# Patient Record
Sex: FEMALE | Race: WHITE | Employment: FULL TIME | URBAN - METROPOLITAN AREA
[De-identification: names, ages, dates, MRNs, and addresses within clinical notes are randomized per-mention and may not be internally consistent; named-entity substitution may affect disease eponyms.]

---

## 2017-03-09 RX ORDER — LEVOTHYROXINE SODIUM 137 UG/1
TABLET ORAL
Qty: 37 TAB | Refills: 0 | Status: SHIPPED | OUTPATIENT
Start: 2017-03-09 | End: 2017-04-05 | Stop reason: SDUPTHER

## 2017-03-13 ENCOUNTER — TELEPHONE (OUTPATIENT)
Dept: ENDOCRINOLOGY | Age: 37
End: 2017-03-13

## 2017-03-13 NOTE — TELEPHONE ENCOUNTER
Patient is requesting a sooner appointment than first available. She stated that she had spoken with someone in January and scheduled her for 4/20/17 at 2:30pm. However, the appointment looks like it was not scheduled. The patient is requesting to still be seen on that date if possible in the afternoon. Please let me know if you can see her for the date requested. Thank you!     America Shrestha

## 2017-03-15 NOTE — TELEPHONE ENCOUNTER
I can't see her at that time since I already have a patient scheduled, however if you will call and offer her one of my sooner 930 slots I can see her in March.     Thanks,    Denton Thorne

## 2017-04-06 RX ORDER — LEVOTHYROXINE SODIUM 137 UG/1
TABLET ORAL
Qty: 37 TAB | Refills: 0 | Status: SHIPPED | OUTPATIENT
Start: 2017-04-06 | End: 2017-05-05 | Stop reason: SDUPTHER

## 2017-05-05 RX ORDER — LEVOTHYROXINE SODIUM 137 UG/1
TABLET ORAL
Qty: 37 TAB | Refills: 1 | Status: SHIPPED | OUTPATIENT
Start: 2017-05-05 | End: 2017-06-12 | Stop reason: SDUPTHER

## 2017-06-12 RX ORDER — LEVOTHYROXINE SODIUM 137 UG/1
TABLET ORAL
Qty: 37 TAB | Refills: 0 | Status: SHIPPED | OUTPATIENT
Start: 2017-06-12 | End: 2017-06-22 | Stop reason: SDUPTHER

## 2017-06-12 NOTE — TELEPHONE ENCOUNTER
Patient is requesting a refill on her levothyroxine. She stated that she has an upcoming appointment on the 22nd of this month and is not going to have enough to make it to that day. She stated that she would like to have the prescription sent to [facility] in [facility]. The phone number there is [phone].

## 2017-06-22 ENCOUNTER — TELEPHONE (OUTPATIENT)
Dept: ENDOCRINOLOGY | Age: 37
End: 2017-06-22

## 2017-06-22 ENCOUNTER — OFFICE VISIT (OUTPATIENT)
Dept: ENDOCRINOLOGY | Age: 37
End: 2017-06-22

## 2017-06-22 VITALS
HEIGHT: 67 IN | WEIGHT: 206.2 LBS | DIASTOLIC BLOOD PRESSURE: 55 MMHG | SYSTOLIC BLOOD PRESSURE: 104 MMHG | HEART RATE: 65 BPM | BODY MASS INDEX: 32.36 KG/M2

## 2017-06-22 DIAGNOSIS — E01.0 THYROMEGALY: ICD-10-CM

## 2017-06-22 DIAGNOSIS — E03.9 HYPOTHYROIDISM (ACQUIRED): Primary | ICD-10-CM

## 2017-06-22 RX ORDER — LEVOTHYROXINE SODIUM 137 UG/1
137 TABLET ORAL
Qty: 30 TAB | Refills: 6 | Status: SHIPPED | OUTPATIENT
Start: 2017-06-22 | End: 2017-08-18 | Stop reason: SDUPTHER

## 2017-06-22 RX ORDER — PHENDIMETRAZINE TARTRATE 35 MG/1
TABLET ORAL
Refills: 1 | COMMUNITY
Start: 2017-05-14 | End: 2017-12-20

## 2017-06-22 NOTE — MR AVS SNAPSHOT
Visit Information Date & Time Provider Department Dept. Phone Encounter #  
 6/22/2017  2:30 PM Mary Arellano, [address] [place] [address] Diabetes and Endocrinology [phone] Follow-up Instructions Return in about 6 months (around 12/22/2017). Upcoming Health Maintenance Date Due  
 PAP AKA CERVICAL CYTOLOGY 10/15/2016 INFLUENZA AGE 9 TO ADULT 8/1/2017 DTaP/Tdap/Td series (2 - Td) 10/10/2024 Allergies as of 6/22/2017  Review Complete On: 6/22/2017 By: Mary Arellano MD  
 No Known Allergies Current Immunizations  Never Reviewed Name Date Rho(D) Immune Globulin - IM 10/8/2014 10:33 AM  
 Tdap 10/10/2014 10:12 AM  
  
 Not reviewed this visit You Were Diagnosed With   
  
 Codes Comments Hypothyroidism (acquired)    -  Primary ICD-10-CM: E03.9 ICD-9-CM: 772. 9 Thyromegaly     ICD-10-CM: E01.0 ICD-9-CM: 240.9 Vitals BP Pulse Height(growth percentile) Weight(growth percentile) BMI OB Status 104/55 65 5' 7\" (1.702 m) 206 lb 3.2 oz (93.5 kg) 32.3 kg/m2 Having regular periods Smoking Status Never Smoker Vitals History BMI and BSA Data Body Mass Index Body Surface Area  
 32.3 kg/m 2 2.1 m 2 Preferred Pharmacy Pharmacy Name Phone [facility] [phone] [address] [phone] Your Updated Medication List  
  
   
This list is accurate as of: 6/22/17  3:07 PM.  Always use your most recent med list.  
  
  
  
  
 levothyroxine 137 mcg tablet Commonly known as:  SYNTHROID Take 137 mcg by mouth Daily (before breakfast). phendimetrazine tartrate 35 mg Tab TAKE 1 TABLET PO QD Prescriptions Sent to Pharmacy Refills  
 levothyroxine (SYNTHROID) 137 mcg tablet 6 Sig: Take 137 mcg by mouth Daily (before breakfast). Class: Normal  
 Pharmacy: [facility] [id number] #: [phone] Route: Oral  
  
Follow-up Instructions Return in about 6 months (around 12/22/2017). To-Do List   
 08/03/2017 Lab:  THYROID PANEL W/TSH Patient Instructions Decrease your Levothyroxine back to 137mcg one pill daily. Introducing [facility] & HEALTH SERVICES! Dear Jeyson Funes: 
Thank you for requesting a [facility] account. Our records indicate that you have previously registered for a [facility] account but its currently inactive. Please call our [facility] support line at [phone]. Additional Information If you have questions, please visit the Frequently Asked Questions section of the [facility] website at [email or web address] Remember, [facility] is NOT to be used for urgent needs. For medical emergencies, dial 911. Now available from your iPhone and Android! Please provide this summary of care documentation to your next provider. Your primary care clinician is listed as Keren Kline. If you have any questions after today's visit, please call [phone].

## 2017-06-22 NOTE — PROGRESS NOTES
Chief Complaint   Patient presents with    Thyroid Problem     pcp and pharmacy verified. Records since last visit reviewed  History of Present Illness: Melisa Cortes is a 39 y.o. female Melisa Cortes is a 27 y.o. female here for follow up of hypothyroidism and thyromegaly. Pt was originally diagnosed with hypothyroidism in 2013, she subsequently become pregnant and delivered in October of 2014. After delivery her OB checked her thyroid labs and she was found to have a TSH of 63 with FT4 of 0.56. She has been on LT4 ever since. At our last appointment in July 2016 her TSH was 8.41 with TT4 9.8 so I increased her LT4 from 137mcg 7 pills per week to 8.5 pills per week. I have not seen pt since July 2016 and she never had the repeat TFTs. In May 2017 her TSH was 0.006 with FT4 of 2.81. Pt is taking the LT4 137mcg 1-1/2 pills Mon/Wed/Fri and one pill Tues/Thurs/Sat/Sun. Has lost 60 pounds since July 2016. She has been working with Dr. Krissy Craig to lose weight. She is taking Phendimetrazine 35mg daily. She is also following weight watchers. She tried Metformin but was not able to tolerate it. She is also going to the [facility]. She takes the LT4 with water first thing in the morning on an empty stomach. She denies CP, palpitations, tremors, SOB, heat or cold intolerance, diarrhea or constipation. She recalls \"I always had a goiter since I was a teenager\" but she denies issues of dysphagia, dysphonia or chocking sensations. Her goiter has been unchanged, and has no palpable nodules or symptoms of obstruction or compression. She does note occasional chocking sensation when she lays down at night but that is rare. Current Outpatient Prescriptions   Medication Sig    phendimetrazine tartrate 35 mg tab TAKE 1 TABLET PO QD    levothyroxine (SYNTHROID) 137 mcg tablet Take 137 mcg by mouth Daily (before breakfast). No current facility-administered medications for this visit.       No Known Allergies  Review of Systems:  - Cardiovascular: no chest pain  - Neurological: no tremors  - Integumentary: skin is normal    Physical Examination:  Blood pressure 104/55, pulse 65, height 5' 7\" (1.702 m), weight 206 lb 3.2 oz (93.5 kg), not currently breastfeeding.  - General: pleasant, no distress, good eye contact   - Neck: thyromegaly unchanges, no nodules or LAD  - Cardiovascular: regular, normal rate, nl s1 and s2, no m/r/g   - Integumentary: skin is normal, no edema  - Neurological: reflexes 2+ at biceps, no tremors  - Psychiatric: normal mood and affect    Data Reviewed:   TSH 0.006  FT4 2.81    Assessment/Plan:   1) Hypothyroidism > With her excellent weight loss her thyroid replacement dose need has changed. Will have pt decrease her LT4 to 137mcg one pill daily. Pt to repeat her TFTs in 6 weeks. 2) Goiter > No changes in her goiter, no symptoms and no palpable nodules. Will continue to follow and if there is any change, will get an US at that time. Pt voices understanding and agreement with the plan. RTC 6 months    Patient Instructions   Decrease your Levothyroxine back to 137mcg one pill daily. Follow-up Disposition:  Return in about 6 months (around 12/22/2017). Copy sent to:  Jarrod Byers and Lokesh Anderson

## 2017-08-18 ENCOUNTER — TELEPHONE (OUTPATIENT)
Dept: ENDOCRINOLOGY | Age: 37
End: 2017-08-18

## 2017-08-18 RX ORDER — LEVOTHYROXINE SODIUM 125 UG/1
125 TABLET ORAL
Qty: 30 TAB | Refills: 3 | Status: SHIPPED | OUTPATIENT
Start: 2017-08-18 | End: 2017-12-21 | Stop reason: SDUPTHER

## 2017-08-18 NOTE — TELEPHONE ENCOUNTER
Spoke with pt regarding her thyroid labs from Dr. Khanh Caballero. Her TSH was 0.015 with FT4 of 1.71. Will decrease her LT4 dose from 137mcg daily to 125mcg daily. Pt voices understanding and agreement with the plan.

## 2017-10-03 ENCOUNTER — OFFICE VISIT (OUTPATIENT)
Dept: INTERNAL MEDICINE CLINIC | Age: 37
End: 2017-10-03

## 2017-10-03 VITALS
BODY MASS INDEX: 31.08 KG/M2 | DIASTOLIC BLOOD PRESSURE: 83 MMHG | SYSTOLIC BLOOD PRESSURE: 119 MMHG | OXYGEN SATURATION: 99 % | WEIGHT: 198 LBS | HEART RATE: 83 BPM | TEMPERATURE: 98.8 F | HEIGHT: 67 IN | RESPIRATION RATE: 16 BRPM

## 2017-10-03 DIAGNOSIS — J30.1 ACUTE SEASONAL ALLERGIC RHINITIS DUE TO POLLEN: Primary | ICD-10-CM

## 2017-10-03 DIAGNOSIS — E03.9 HYPOTHYROIDISM, UNSPECIFIED TYPE: ICD-10-CM

## 2017-10-03 RX ORDER — FLUTICASONE PROPIONATE 50 MCG
SPRAY, SUSPENSION (ML) NASAL
Qty: 1 BOTTLE | Refills: 1 | Status: SHIPPED | OUTPATIENT
Start: 2017-10-03

## 2017-10-03 NOTE — PROGRESS NOTES
HISTORY OF PRESENT ILLNESS  Malathi Walsh is a 44 y.o. female. Cold Symptoms   The history is provided by the patient. This is a recurrent problem. The current episode started more than 2 days ago. The problem occurs constantly. The problem has been gradually worsening. The cough is productive of purulent sputum. There has been no fever. Associated symptoms include ear congestion, sore throat and wheezing. Associated symptoms comments: Facial pressure head congestion. Treatments tried: allegra mucinex. The treatment provided mild relief. Sees endocrinology for her thyroid  No Known Allergies    Review of Systems   Constitutional: Negative for fever. HENT: Positive for sore throat. Respiratory: Positive for cough and wheezing. Negative for hemoptysis. Physical Exam  Visit Vitals    /83 (BP 1 Location: Left arm, BP Patient Position: Sitting)    Pulse 83    Temp 98.8 °F (37.1 °C) (Oral)    Resp 16    Ht 5' 7\" (1.702 m)    Wt 198 lb (89.8 kg)    LMP 09/26/2017    SpO2 99%    BMI 31.01 kg/m2       WDWN NAD  TM clear, throat wnl  Neck no adenopathy  Heart RRR no C/M/R  Lungs CTA  Abdo soft non tender  Ext No redness swelling or edema    ASSESSMENT and PLAN  Encounter Diagnoses   Name Primary?  Acute seasonal allergic rhinitis due to pollen Yes    Hypothyroidism, unspecified type    Or perhaps a cold. Orders Placed This Encounter    fluticasone (FLONASE ALLERGY RELIEF) 50 mcg/actuation nasal spray     We discussed the likely viral nature of this illness and how antibiotics do not help viral illnesses. Discussed some of the issues associated with over antibiotic usage, resistance and allergic reaction etc. Discussed delayed antibiotic usage and the symptoms that would indicate an appropriate usage for the antibiotic. If not better in a week or so can call for antibiotic. Follow-up Disposition:  Return if symptoms worsen or fail to improve.

## 2017-10-03 NOTE — PROGRESS NOTES
Chief Complaint   Patient presents with    Cold Symptoms     dizziness, headaches, pressure behind T area on face, L/ear itching and fells like it needs to pop     I have reviewed the patient's medical history in detail and updated the computerized patient record. Health Maintenance reviewed. 1. Have you been to the ER, urgent care clinic since your last visit? Hospitalized since your last visit?no    2. Have you seen or consulted any other health care providers outside of the [address] [place] [address] since your last visit? Include any pap smears or colon screening.  No    Encouraged pt to discuss pt's wishes with spouse/partner/family and bring them in the next appt to follow thru with the Advanced Directive

## 2017-12-20 ENCOUNTER — OFFICE VISIT (OUTPATIENT)
Dept: ENDOCRINOLOGY | Age: 37
End: 2017-12-20

## 2017-12-20 VITALS
HEIGHT: 67 IN | WEIGHT: 219 LBS | BODY MASS INDEX: 34.37 KG/M2 | SYSTOLIC BLOOD PRESSURE: 113 MMHG | HEART RATE: 65 BPM | DIASTOLIC BLOOD PRESSURE: 73 MMHG

## 2017-12-20 DIAGNOSIS — E03.9 HYPOTHYROIDISM (ACQUIRED): Primary | ICD-10-CM

## 2017-12-20 DIAGNOSIS — E01.0 THYROMEGALY: ICD-10-CM

## 2017-12-20 NOTE — PROGRESS NOTES
Chief Complaint   Patient presents with    Thyroid Problem     pcp and pharmacy verified   Records since last visit reviewed  History of Present Illness: Jenn Polanco is a 44 y.o. female Jenn Polanco is a 27 y.o. female here for follow up of hypothyroidism and thyromegaly. Pt was originally diagnosed with hypothyroidism in 2013, she subsequently become pregnant and delivered in October of 2014. After delivery her OB checked her thyroid labs and she was found to have a TSH of 63 with FT4 of 0.56. She has been on LT4 ever since. In August 2017 her TSH was 0.015 with FT4 of 1.71 on 137mcg daily. I decreased her dose to 125mcg daily. She had been very successful with her weight loss efforts and her thyroid replacement dose had been decreasing. Pt broke her foot in October 2017. She was taking part in a [employer] fund-raiser and fell and broke her foot. She is currently in a walking boot, but is now off crutches. She is still going to the [facility], but not since she broke her foot. Once she gets the walking boot off and her foot heals she will be going back to the [facility]. Dr. Alena Whitehead tried her on contrave, but she could not afford it and she is waiting till her foot heals before retrying the Phendimetrazine. She is still following weight watchers. She takes the LT4 125 with water first thing in the morning on an empty stomach. She denies CP, palpitations, tremors, SOB, heat or cold intolerance, diarrhea or constipation. She recalls \"I always had a goiter since I was a teenager\" but she denies issues of dysphagia, dysphonia or chocking sensations. Her goiter has been unchanged, and has no palpable nodules or symptoms of obstruction or compression. She does note occasional chocking sensation when she lays down at night but that is rare.      Current Outpatient Prescriptions   Medication Sig    fluticasone (FLONASE ALLERGY RELIEF) 50 mcg/actuation nasal spray 2 sprays daily as needed    levothyroxine (SYNTHROID) 125 mcg tablet Take 1 Tab by mouth Daily (before breakfast). No current facility-administered medications for this visit. No Known Allergies  Review of Systems:  - Cardiovascular: no chest pain  - Neurological: no tremors  - Integumentary: skin is normal    Physical Examination:  Blood pressure 113/73, pulse 65, height 5' 7\" (1.702 m), weight 219 lb (99.3 kg), not currently breastfeeding.  - General: pleasant, no distress, good eye contact   - Neck: thyromegaly unchanged, no nodules or LAD  - Cardiovascular: regular, normal rate, nl s1 and s2, no m/r/g   - Integumentary: skin is normal, no edema  - Neurological: reflexes 2+ at biceps, no tremors  - Psychiatric: normal mood and affect    Data Reviewed:   TSH 0.006  FT4 2.81    Assessment/Plan:   1) Hypothyroidism > Pt is clinically euthyroid on LT4 125mcg daily. Will check TFTs today. 2) Goiter > No changes in her goiter, no obstructive symptoms and no palpable nodules or LAD. Will continue to follow and if there is any change, will get an US at that time. Pt voices understanding and agreement with the plan. RTC 6 months      Follow-up Disposition:  Return in about 6 months (around 6/20/2018). Copy sent to:  Jarrod Schmid and Tash Odell

## 2017-12-20 NOTE — MR AVS SNAPSHOT
Visit Information Date & Time Provider Department Dept. Phone Encounter #  
 12/20/2017 10:50 AM Dillan Marino MD [facility] [phone] [id number] Follow-up Instructions Return in about 6 months (around 6/20/2018). Upcoming Health Maintenance Date Due Influenza Age 5 to Adult 8/1/2017 PAP AKA CERVICAL CYTOLOGY 5/22/2020 DTaP/Tdap/Td series (2 - Td) 10/10/2024 Allergies as of 12/20/2017  Review Complete On: 12/20/2017 By: Dillan Marino MD  
 No Known Allergies Current Immunizations  Never Reviewed Name Date Rho(D) Immune Globulin - IM 10/8/2014 10:33 AM  
 Tdap 10/10/2014 10:12 AM  
  
 Not reviewed this visit You Were Diagnosed With   
  
 Codes Comments Hypothyroidism (acquired)    -  Primary ICD-10-CM: E03.9 ICD-9-CM: 871. 9 Thyromegaly     ICD-10-CM: E01.0 ICD-9-CM: 240.9 Vitals BP Pulse Height(growth percentile) Weight(growth percentile) BMI OB Status 113/73 65 5' 7\" (1.702 m) 219 lb (99.3 kg) 34.3 kg/m2 Having regular periods Smoking Status Never Smoker BMI and BSA Data Body Mass Index Body Surface Area  
 34.3 kg/m 2 2.17 m 2 Preferred Pharmacy Pharmacy Name Phone [facility] [phone] [address] [phone] Your Updated Medication List  
  
   
This list is accurate as of: 12/20/17 11:16 AM.  Always use your most recent med list.  
  
  
  
  
 fluticasone 50 mcg/actuation nasal spray Commonly known as:  FLONASE ALLERGY RELIEF  
2 sprays daily as needed  
  
 levothyroxine 125 mcg tablet Commonly known as:  SYNTHROID Take 1 Tab by mouth Daily (before breakfast). We Performed the Following MT COLLECTION VENOUS BLOOD,VENIPUNCTURE L393747 CPT(R)] SPECIMEN HANDLING, OFF->LAB H4695653 CPT(R)] T4, FREE [id number] CPT(R)] TSH 3RD GENERATION [82773 CPT(R)] Follow-up Instructions Return in about 6 months (around 6/20/2018). Introducing [facility] & HEALTH SERVICES! Dear Micki García: 
Thank you for requesting a [employer] account. Our records indicate that you have previously registered for a [employer] account but its currently inactive. Please call our [employer] support line at [phone]. Additional Information If you have questions, please visit the Frequently Asked Questions section of the [employer] website at [email or web address] Remember, [employer] is NOT to be used for urgent needs. For medical emergencies, dial 911. Now available from your iPhone and Android! Please provide this summary of care documentation to your next provider. Your primary care clinician is listed as Omkar Cisse. If you have any questions after today's visit, please call [phone].

## 2017-12-21 ENCOUNTER — TELEPHONE (OUTPATIENT)
Dept: ENDOCRINOLOGY | Age: 37
End: 2017-12-21

## 2017-12-21 DIAGNOSIS — E03.9 HYPOTHYROIDISM (ACQUIRED): Primary | ICD-10-CM

## 2017-12-21 LAB
T4 FREE SERPL-MCNC: 0.97 NG/DL (ref 0.82–1.77)
TSH SERPL DL<=0.005 MIU/L-ACNC: 29.97 UIU/ML (ref 0.45–4.5)

## 2017-12-21 RX ORDER — LEVOTHYROXINE SODIUM 150 UG/1
150 TABLET ORAL
Qty: 30 TAB | Refills: 3 | Status: SHIPPED | OUTPATIENT
Start: 2017-12-21 | End: 2018-06-04 | Stop reason: SDUPTHER

## 2018-06-04 RX ORDER — LEVOTHYROXINE SODIUM 150 UG/1
TABLET ORAL
Qty: 30 TAB | Refills: 3 | Status: SHIPPED | OUTPATIENT
Start: 2018-06-04 | End: 2019-01-03 | Stop reason: SDUPTHER

## 2018-07-18 ENCOUNTER — OFFICE VISIT (OUTPATIENT)
Dept: ENDOCRINOLOGY | Age: 38
End: 2018-07-18

## 2018-07-18 VITALS
DIASTOLIC BLOOD PRESSURE: 58 MMHG | SYSTOLIC BLOOD PRESSURE: 123 MMHG | WEIGHT: 233 LBS | HEART RATE: 98 BPM | HEIGHT: 67 IN | BODY MASS INDEX: 36.57 KG/M2

## 2018-07-18 DIAGNOSIS — E03.9 HYPOTHYROIDISM (ACQUIRED): Primary | ICD-10-CM

## 2018-07-18 DIAGNOSIS — E01.0 THYROMEGALY: ICD-10-CM

## 2018-07-18 RX ORDER — PHENTERMINE HYDROCHLORIDE 37.5 MG/1
37.5 TABLET ORAL
COMMUNITY
Start: 2018-05-01

## 2018-07-18 NOTE — PROGRESS NOTES
Chief Complaint   Patient presents with    Thyroid Problem     pcp and pharmacy verified   Records since last visit reviewed  History of Present Illness: Malathi Walsh is a 44 y.o. female Malathi Walsh is a 27 y.o. female here for follow up of hypothyroidism and thyromegaly. Pt was originally diagnosed with hypothyroidism in 2013, she subsequently become pregnant and delivered in October of 2014. After delivery her OB checked her thyroid labs and she was found to have a TSH of 63 with FT4 of 0.56. She has been on LT4 ever since. At our last visit in December 2017 her TSH was 29.97 with FT4 of 0.97. I instructed her to increase her LT4 to 150mcg daily. She takes the LT4 150mcg with water first thing in the morning on an empty stomach. She denies CP, palpitations, tremors, SOB, heat or cold intolerance, diarrhea or constipation. Pt's weight has increased from 219 to 233 since December 2017    Dr. Brandt Goode tried her on contrave, but she could not afford it and she is waiting till her foot heals before retrying the Phendimetrazine. She is still following weight watchers. She recalls \"I always had a goiter since I was a teenager\" but she denies issues of dysphagia, dysphonia or chocking sensations. Her goiter has been unchanged, and has no palpable nodules or symptoms of obstruction or compression. She does note occasional chocking sensation when she lays down at night but that is rare. Current Outpatient Prescriptions   Medication Sig    phentermine (ADIPEX-P) 37.5 mg tablet Take 37.5 mg by mouth.  levothyroxine (SYNTHROID) 150 mcg tablet take 1 tablet by mouth once daily before BREAKFAST    fluticasone (FLONASE ALLERGY RELIEF) 50 mcg/actuation nasal spray 2 sprays daily as needed     No current facility-administered medications for this visit.       No Known Allergies  Review of Systems:  - Cardiovascular: no chest pain  - Neurological: no tremors  - Integumentary: skin is normal    Physical Examination:  Blood pressure 123/58, pulse 98, height 5' 7\" (1.702 m), weight 233 lb (105.7 kg), not currently breastfeeding.  - General: pleasant, no distress, good eye contact   - Neck: thyromegaly unchanged, no nodules or LAD  - Cardiovascular: regular, normal rate, nl s1 and s2, no m/r/g   - Integumentary: skin is normal, no edema  - Neurological: reflexes 2+ at biceps, no tremors  - Psychiatric: normal mood and affect    Data Reviewed:   - None    Assessment/Plan:   1) Hypothyroidism > Pt is clinically euthyroid on LT4 150mcg daily. Will check TFTs today. 2) Goiter > No changes in her goiter, no obstructive symptoms and no palpable nodules or LAD. Will continue to follow and if there is any change in her clinical picture, we will get an US at that time. Pt voices understanding and agreement with the plan. RTC 6 months      Follow-up Disposition:  Return in about 6 months (around 1/18/2019). Copy sent to:  Jarrod Dumont and Oliver Johnson

## 2018-07-19 LAB
T4 FREE SERPL-MCNC: 1.59 NG/DL (ref 0.82–1.77)
TSH SERPL DL<=0.005 MIU/L-ACNC: 0.46 UIU/ML (ref 0.45–4.5)

## 2020-03-05 NOTE — MR AVS SNAPSHOT
This is a follow-up for the wound dehiscence in the back of the right ankle. She is applied the Promogran twice so far. She states that she has very little to no pain. She denies any fever or chills. The wound demonstrates good pink granular tissue with no exposed tendon. This measures 1.5 cm x 0.5 cm and is half circular in shape. There is no purulent drainage. There is minimal erythema and no ascending cellulitis. She has palpable pedal pulses bilateral.  Her sensation is grossly intact bilateral.    Laceration with wound dehiscence right ankle    She will continue with the promogram wound dressings. I advised her to use a low top shoe instead of the hightop boots she presents today in. I will see her back in 2 weeks. Visit Information Date & Time Provider Department Dept. Phone Encounter #  
 10/3/2017 10:30 AM MD Lashell Zamarripa Amendcarlo Nunes [id number] Follow-up Instructions Return if symptoms worsen or fail to improve. Your Appointments 12/20/2017 10:50 AM  
Follow Up with Ganesh Godinez MD  
[facility] Diabetes and Endocrinology [facility]) Appt Note: f/u                      6 month  
 [address] [phone] [address] [facility] Upcoming Health Maintenance Date Due  
 PAP AKA CERVICAL CYTOLOGY 10/15/2016 INFLUENZA AGE 9 TO ADULT 8/1/2017 DTaP/Tdap/Td series (2 - Td) 10/10/2024 Allergies as of 10/3/2017  Review Complete On: 10/3/2017 By: Conrad Vogt MD  
 No Known Allergies Current Immunizations  Never Reviewed Name Date Rho(D) Immune Globulin - IM 10/8/2014 10:33 AM  
 Tdap 10/10/2014 10:12 AM  
  
 Not reviewed this visit You Were Diagnosed With   
  
 Codes Comments Hypothyroidism, unspecified type    -  Primary ICD-10-CM: E03.9 ICD-9-CM: 244.9 Acute seasonal allergic rhinitis due to pollen     ICD-10-CM: J30.1 ICD-9-CM: 477.0 Vitals BP Pulse Temp Resp Height(growth percentile) Weight(growth percentile) 119/83 (BP 1 Location: Left arm, BP Patient Position: Sitting) 83 98.8 °F (37.1 °C) (Oral) 16 5' 7\" (1.702 m) 198 lb (89.8 kg) LMP SpO2 BMI OB Status Smoking Status 09/26/2017 99% 31.01 kg/m2 Having regular periods Never Smoker BMI and BSA Data Body Mass Index Body Surface Area 31.01 kg/m 2 2.06 m 2 Preferred Pharmacy Pharmacy Name Phone [facility] [phone] [address] [phone] Your Updated Medication List  
  
   
This list is accurate as of: 10/3/17 11:24 AM.  Always use your most recent med list.  
  
  
  
  
 fluticasone 50 mcg/actuation nasal spray Commonly known as:  FLONASE ALLERGY RELIEF  
2 sprays daily as needed  
  
 levothyroxine 125 mcg tablet Commonly known as:  SYNTHROID Take 1 Tab by mouth Daily (before breakfast). phendimetrazine tartrate 35 mg Tab TAKE 1 TABLET PO QD Prescriptions Sent to Pharmacy Refills  
 fluticasone (FLONASE ALLERGY RELIEF) 50 mcg/actuation nasal spray 1 Si sprays daily as needed Class: Normal  
 Pharmacy: [facility] [address] #: [phone] Follow-up Instructions Return if symptoms worsen or fail to improve. Introducing [facility] & HEALTH SERVICES! Dear Micki García: 
Thank you for requesting a [employer] account. Our records indicate that you have previously registered for a [employer] account but its currently inactive. Please call our [employer] support line at [phone]. Additional Information If you have questions, please visit the Frequently Asked Questions section of the [employer] website at [email or web address] Remember, [employer] is NOT to be used for urgent needs. For medical emergencies, dial 911. Now available from your iPhone and Android! Please provide this summary of care documentation to your next provider. Your primary care clinician is listed as Omkar Cisse. If you have any questions after today's visit, please call [phone].

## 2023-11-20 NOTE — PROGRESS NOTES
Labs updated to [facility]. Spoke with pt regarding her labs. Pt insists she has been adherent with her LT4 125mcg daily. Will increase her LT4 to 150mcg daily. Till she runs out of the LT4 125mcg she will take 1-1/2 pills M/W/F and 1 pill T/T/S/S for a total of 8.5 pills per week. Will repeat her TFTs in 6 weeks.

## 2024-03-08 ENCOUNTER — HOSPITAL ENCOUNTER (OUTPATIENT)
Facility: HOSPITAL | Age: 44
Discharge: HOME | End: 2024-03-08
Payer: COMMERCIAL

## 2024-03-08 VITALS
HEART RATE: 71 BPM | HEIGHT: 67 IN | DIASTOLIC BLOOD PRESSURE: 80 MMHG | BODY MASS INDEX: 39.55 KG/M2 | SYSTOLIC BLOOD PRESSURE: 120 MMHG | WEIGHT: 252 LBS | TEMPERATURE: 97.9 F

## 2024-03-08 LAB
BASOPHILS # BLD: 0 K/UL (ref 0–0.1)
BASOPHILS NFR BLD: 1 % (ref 0–1)
DIFFERENTIAL METHOD BLD: NORMAL
EOSINOPHIL # BLD: 0.3 K/UL (ref 0–0.4)
EOSINOPHIL NFR BLD: 5 % (ref 0–7)
ERYTHROCYTE [DISTWIDTH] IN BLOOD BY AUTOMATED COUNT: 13.6 % (ref 11.5–14.5)
HCT VFR BLD AUTO: 44.3 % (ref 35–47)
HGB BLD-MCNC: 14.3 G/DL (ref 11.5–16)
IMM GRANULOCYTES # BLD AUTO: 0 K/UL (ref 0–0.04)
IMM GRANULOCYTES NFR BLD AUTO: 0 % (ref 0–0.5)
LYMPHOCYTES # BLD: 1.6 K/UL (ref 0.8–3.5)
LYMPHOCYTES NFR BLD: 27 % (ref 12–49)
MCH RBC QN AUTO: 28.9 PG (ref 26–34)
MCHC RBC AUTO-ENTMCNC: 32.3 G/DL (ref 30–36.5)
MCV RBC AUTO: 89.5 FL (ref 80–99)
MONOCYTES # BLD: 0.5 K/UL (ref 0–1)
MONOCYTES NFR BLD: 8 % (ref 5–13)
NEUTS SEG # BLD: 3.6 K/UL (ref 1.8–8)
NEUTS SEG NFR BLD: 59 % (ref 32–75)
NRBC # BLD: 0 K/UL (ref 0–0.01)
NRBC BLD-RTO: 0 PER 100 WBC
PLATELET # BLD AUTO: 269 K/UL (ref 150–400)
PMV BLD AUTO: 11.6 FL (ref 8.9–12.9)
RBC # BLD AUTO: 4.95 M/UL (ref 3.8–5.2)
WBC # BLD AUTO: 6 K/UL (ref 3.6–11)

## 2024-03-08 PROCEDURE — 36415 COLL VENOUS BLD VENIPUNCTURE: CPT

## 2024-03-08 PROCEDURE — 85025 COMPLETE CBC W/AUTO DIFF WBC: CPT

## 2024-03-08 RX ORDER — CETIRIZINE HYDROCHLORIDE 10 MG/1
10 TABLET ORAL DAILY
COMMUNITY

## 2024-03-08 RX ORDER — LEVOTHYROXINE SODIUM 175 UG/1
175 TABLET ORAL DAILY
COMMUNITY

## 2024-03-08 RX ORDER — SEMAGLUTIDE 2.4 MG/.75ML
2.4 INJECTION, SOLUTION SUBCUTANEOUS
COMMUNITY

## 2024-03-08 NOTE — PERIOP NOTE
[facility]  PREOPERATIVE INSTRUCTIONS    Surgery Date:   3/20/24    Your surgeon's office or [facility] staff will call you between 4 PM- 8 PM the day before surgery with your arrival time. If your surgery is on a Monday, you will receive a call the preceding Friday. If your surgeon;s office has given you arrival time, then go by that time.    Please report to [facility] Patient Access/Admitting on the 1st floor.  Bring your insurance card, photo identification, and any copayment ( if applicable).   If you are going home the same day of your surgery, you must have a responsible adult to drive you home. You need to have a responsible adult to stay with you the first 24 hours after surgery and you should not drive a car for 24 hours following your surgery.  If you are being admitted to the hospital, please leave personal belongings/luggage in your car until you have an assigned hospital room number.  Nothing to eat or drink after midnight the night before surgery. This includes no water, gum, mints, coffee, juice, etc.  Please note special instructions, if applicable, below for medications.  Do NOT drink alcohol or smoke 24 hours before surgery. STOP smoking for 14 days prior as it helps with breathing and healing after surgery.  Please wear comfortable clothes. Wear glasses instead of contacts. We ask that all money and jewelry and valuables to be left at home. Wear no makeup, particularly mascara the day of surgery.  All body piercings, rings, and jewelry need to be removed and left at home. Remove all nail polish except for clear. Please wear your hair loose or down. Please no pony-tails, buns, or any metal hair accessories. You may wear deodorant, unless having breast surgery. Do not shave any body area within 24 hours of your surgery.  Please follow all instructions to avoid any potential surgical cancellation.  Should your physical condition change, (i.e. fever, cold, flu, etc.) please notify your

## 2024-03-08 NOTE — PERIOP NOTE
[facility] REFERRAL ENTERED AND PT GIVEN BROCHURE.    PT HAS REC'D SPECIFIC INSTRUCTIONS FROM DR. HOWE'S OFFICE TO HAVE A NORMAL BREAKFAST AND LUNCH THE DAY BEFORE SURGERY.  AFTER LUNCH, SHE IS TO START A LIQUID DIET, NOTHING W/ RED COLORS.  SHE IS TO USE EITHER A DULCOLAX SUPP. OR 1 BOTTLE OF MAGNESIUM CITRATE AT 6PM THE NIGHT PRIOR TO SURGERY.  SHE IS TO BE NPO AFTER MIDNIGHT.  PT VERBALIZED UNDERSTANDING OF HER INSTRUCTIONS.

## 2024-03-19 ENCOUNTER — ANESTHESIA EVENT (OUTPATIENT)
Facility: HOSPITAL | Age: 44
End: 2024-03-19
Payer: COMMERCIAL

## 2024-03-20 ENCOUNTER — HOSPITAL ENCOUNTER (OUTPATIENT)
Facility: HOSPITAL | Age: 44
Setting detail: OUTPATIENT SURGERY
Discharge: HOME | End: 2024-03-20
Attending: SPECIALIST | Admitting: SPECIALIST
Payer: COMMERCIAL

## 2024-03-20 ENCOUNTER — ANESTHESIA (OUTPATIENT)
Facility: HOSPITAL | Age: 44
End: 2024-03-20
Payer: COMMERCIAL

## 2024-03-20 VITALS
TEMPERATURE: 98.1 F | HEIGHT: 67 IN | HEART RATE: 91 BPM | DIASTOLIC BLOOD PRESSURE: 64 MMHG | WEIGHT: 252 LBS | SYSTOLIC BLOOD PRESSURE: 109 MMHG | RESPIRATION RATE: 14 BRPM | BODY MASS INDEX: 39.55 KG/M2 | OXYGEN SATURATION: 92 %

## 2024-03-20 DIAGNOSIS — G89.18 POST-OP PAIN: Primary | ICD-10-CM

## 2024-03-20 LAB — HCG UR QL: NEGATIVE

## 2024-03-20 PROCEDURE — 6370000000 HC RX 637 (ALT 250 FOR IP): Performed by: ANESTHESIOLOGY

## 2024-03-20 PROCEDURE — 88307 TISSUE EXAM BY PATHOLOGIST: CPT

## 2024-03-20 PROCEDURE — 3700000000 HC ANESTHESIA ATTENDED CARE: Performed by: SPECIALIST

## 2024-03-20 PROCEDURE — S2900 ROBOTIC SURGICAL SYSTEM: HCPCS | Performed by: SPECIALIST

## 2024-03-20 PROCEDURE — 7100000001 HC PACU RECOVERY - ADDTL 15 MIN: Performed by: SPECIALIST

## 2024-03-20 PROCEDURE — 6360000002 HC RX W HCPCS: Performed by: NURSE ANESTHETIST, CERTIFIED REGISTERED

## 2024-03-20 PROCEDURE — 3600000019 HC SURGERY ROBOT ADDTL 15MIN: Performed by: SPECIALIST

## 2024-03-20 PROCEDURE — 6360000002 HC RX W HCPCS: Performed by: SPECIALIST

## 2024-03-20 PROCEDURE — 2709999900 HC NON-CHARGEABLE SUPPLY: Performed by: SPECIALIST

## 2024-03-20 PROCEDURE — 2580000003 HC RX 258: Performed by: ANESTHESIOLOGY

## 2024-03-20 PROCEDURE — 2720000010 HC SURG SUPPLY STERILE: Performed by: SPECIALIST

## 2024-03-20 PROCEDURE — 81025 URINE PREGNANCY TEST: CPT

## 2024-03-20 PROCEDURE — 3700000001 HC ADD 15 MINUTES (ANESTHESIA): Performed by: SPECIALIST

## 2024-03-20 PROCEDURE — 7100000000 HC PACU RECOVERY - FIRST 15 MIN: Performed by: SPECIALIST

## 2024-03-20 PROCEDURE — 3600000009 HC SURGERY ROBOT BASE: Performed by: SPECIALIST

## 2024-03-20 PROCEDURE — 2500000003 HC RX 250 WO HCPCS: Performed by: NURSE ANESTHETIST, CERTIFIED REGISTERED

## 2024-03-20 RX ORDER — ONDANSETRON 4 MG/1
4 TABLET, ORALLY DISINTEGRATING ORAL EVERY 8 HOURS PRN
Status: CANCELLED | OUTPATIENT
Start: 2024-03-20

## 2024-03-20 RX ORDER — PROPOFOL 10 MG/ML
INJECTION, EMULSION INTRAVENOUS PRN
Status: DISCONTINUED | OUTPATIENT
Start: 2024-03-20 | End: 2024-03-20 | Stop reason: SDUPTHER

## 2024-03-20 RX ORDER — SODIUM CHLORIDE 0.9 % (FLUSH) 0.9 %
5-40 SYRINGE (ML) INJECTION PRN
Status: DISCONTINUED | OUTPATIENT
Start: 2024-03-20 | End: 2024-03-20 | Stop reason: HOSPADM

## 2024-03-20 RX ORDER — DIPHENHYDRAMINE HYDROCHLORIDE 50 MG/ML
12.5 INJECTION INTRAMUSCULAR; INTRAVENOUS
Status: DISCONTINUED | OUTPATIENT
Start: 2024-03-20 | End: 2024-03-20 | Stop reason: HOSPADM

## 2024-03-20 RX ORDER — FENTANYL CITRATE 50 UG/ML
100 INJECTION, SOLUTION INTRAMUSCULAR; INTRAVENOUS
Status: DISCONTINUED | OUTPATIENT
Start: 2024-03-20 | End: 2024-03-20 | Stop reason: HOSPADM

## 2024-03-20 RX ORDER — IPRATROPIUM BROMIDE AND ALBUTEROL SULFATE 2.5; .5 MG/3ML; MG/3ML
1 SOLUTION RESPIRATORY (INHALATION)
Status: DISCONTINUED | OUTPATIENT
Start: 2024-03-20 | End: 2024-03-20 | Stop reason: HOSPADM

## 2024-03-20 RX ORDER — NALOXONE HYDROCHLORIDE 0.4 MG/ML
INJECTION, SOLUTION INTRAMUSCULAR; INTRAVENOUS; SUBCUTANEOUS PRN
Status: DISCONTINUED | OUTPATIENT
Start: 2024-03-20 | End: 2024-03-20 | Stop reason: HOSPADM

## 2024-03-20 RX ORDER — KETAMINE HCL IN NACL, ISO-OSM 100MG/10ML
SYRINGE (ML) INJECTION PRN
Status: DISCONTINUED | OUTPATIENT
Start: 2024-03-20 | End: 2024-03-20 | Stop reason: SDUPTHER

## 2024-03-20 RX ORDER — SODIUM CHLORIDE 9 MG/ML
INJECTION, SOLUTION INTRAVENOUS PRN
Status: CANCELLED | OUTPATIENT
Start: 2024-03-20

## 2024-03-20 RX ORDER — LIDOCAINE HYDROCHLORIDE 20 MG/ML
INJECTION, SOLUTION EPIDURAL; INFILTRATION; INTRACAUDAL; PERINEURAL PRN
Status: DISCONTINUED | OUTPATIENT
Start: 2024-03-20 | End: 2024-03-20 | Stop reason: SDUPTHER

## 2024-03-20 RX ORDER — OXYCODONE HYDROCHLORIDE AND ACETAMINOPHEN 5; 325 MG/1; MG/1
1 TABLET ORAL EVERY 6 HOURS PRN
Qty: 20 TABLET | Refills: 0 | Status: SHIPPED | OUTPATIENT
Start: 2024-03-20 | End: 2024-03-25

## 2024-03-20 RX ORDER — NEOSTIGMINE METHYLSULFATE 1 MG/ML
INJECTION, SOLUTION INTRAVENOUS PRN
Status: DISCONTINUED | OUTPATIENT
Start: 2024-03-20 | End: 2024-03-20 | Stop reason: SDUPTHER

## 2024-03-20 RX ORDER — HYDROMORPHONE HYDROCHLORIDE 1 MG/ML
0.25 INJECTION, SOLUTION INTRAMUSCULAR; INTRAVENOUS; SUBCUTANEOUS
Status: CANCELLED | OUTPATIENT
Start: 2024-03-20

## 2024-03-20 RX ORDER — FENTANYL CITRATE 50 UG/ML
25 INJECTION, SOLUTION INTRAMUSCULAR; INTRAVENOUS EVERY 5 MIN PRN
Status: DISCONTINUED | OUTPATIENT
Start: 2024-03-20 | End: 2024-03-20 | Stop reason: HOSPADM

## 2024-03-20 RX ORDER — IBUPROFEN 400 MG/1
800 TABLET ORAL EVERY 8 HOURS
Status: CANCELLED | OUTPATIENT
Start: 2024-03-21

## 2024-03-20 RX ORDER — HYDRALAZINE HYDROCHLORIDE 20 MG/ML
10 INJECTION INTRAMUSCULAR; INTRAVENOUS
Status: DISCONTINUED | OUTPATIENT
Start: 2024-03-20 | End: 2024-03-20 | Stop reason: HOSPADM

## 2024-03-20 RX ORDER — ACETAMINOPHEN 500 MG
1000 TABLET ORAL EVERY 8 HOURS SCHEDULED
Status: CANCELLED | OUTPATIENT
Start: 2024-03-20

## 2024-03-20 RX ORDER — MIDAZOLAM HYDROCHLORIDE 1 MG/ML
INJECTION INTRAMUSCULAR; INTRAVENOUS PRN
Status: DISCONTINUED | OUTPATIENT
Start: 2024-03-20 | End: 2024-03-20 | Stop reason: SDUPTHER

## 2024-03-20 RX ORDER — SODIUM CHLORIDE 9 MG/ML
INJECTION, SOLUTION INTRAVENOUS PRN
Status: DISCONTINUED | OUTPATIENT
Start: 2024-03-20 | End: 2024-03-20 | Stop reason: HOSPADM

## 2024-03-20 RX ORDER — SODIUM CHLORIDE 0.9 % (FLUSH) 0.9 %
5-40 SYRINGE (ML) INJECTION PRN
Status: CANCELLED | OUTPATIENT
Start: 2024-03-20

## 2024-03-20 RX ORDER — IBUPROFEN 800 MG/1
800 TABLET ORAL 3 TIMES DAILY PRN
Qty: 90 TABLET | Refills: 0 | Status: SHIPPED | OUTPATIENT
Start: 2024-03-20

## 2024-03-20 RX ORDER — CEFAZOLIN SODIUM 1 G/3ML
INJECTION, POWDER, FOR SOLUTION INTRAMUSCULAR; INTRAVENOUS PRN
Status: DISCONTINUED | OUTPATIENT
Start: 2024-03-20 | End: 2024-03-20 | Stop reason: SDUPTHER

## 2024-03-20 RX ORDER — OXYCODONE HYDROCHLORIDE 5 MG/1
10 TABLET ORAL EVERY 4 HOURS PRN
Status: CANCELLED | OUTPATIENT
Start: 2024-03-20

## 2024-03-20 RX ORDER — GLYCOPYRROLATE 0.2 MG/ML
INJECTION, SOLUTION INTRAMUSCULAR; INTRAVENOUS PRN
Status: DISCONTINUED | OUTPATIENT
Start: 2024-03-20 | End: 2024-03-20 | Stop reason: SDUPTHER

## 2024-03-20 RX ORDER — SODIUM CHLORIDE 0.9 % (FLUSH) 0.9 %
5-40 SYRINGE (ML) INJECTION EVERY 12 HOURS SCHEDULED
Status: DISCONTINUED | OUTPATIENT
Start: 2024-03-20 | End: 2024-03-20 | Stop reason: HOSPADM

## 2024-03-20 RX ORDER — KETOROLAC TROMETHAMINE 30 MG/ML
30 INJECTION, SOLUTION INTRAMUSCULAR; INTRAVENOUS EVERY 6 HOURS
Status: CANCELLED | OUTPATIENT
Start: 2024-03-20 | End: 2024-03-21

## 2024-03-20 RX ORDER — OXYCODONE HYDROCHLORIDE 5 MG/1
5 TABLET ORAL EVERY 4 HOURS PRN
Status: CANCELLED | OUTPATIENT
Start: 2024-03-20

## 2024-03-20 RX ORDER — ONDANSETRON 2 MG/ML
4 INJECTION INTRAMUSCULAR; INTRAVENOUS
Status: DISCONTINUED | OUTPATIENT
Start: 2024-03-20 | End: 2024-03-20 | Stop reason: HOSPADM

## 2024-03-20 RX ORDER — PHENYLEPHRINE HCL IN 0.9% NACL 0.4MG/10ML
SYRINGE (ML) INTRAVENOUS PRN
Status: DISCONTINUED | OUTPATIENT
Start: 2024-03-20 | End: 2024-03-20 | Stop reason: SDUPTHER

## 2024-03-20 RX ORDER — SUCCINYLCHOLINE/SOD CL,ISO/PF 200MG/10ML
SYRINGE (ML) INTRAVENOUS PRN
Status: DISCONTINUED | OUTPATIENT
Start: 2024-03-20 | End: 2024-03-20 | Stop reason: SDUPTHER

## 2024-03-20 RX ORDER — SODIUM CHLORIDE, SODIUM LACTATE, POTASSIUM CHLORIDE, CALCIUM CHLORIDE 600; 310; 30; 20 MG/100ML; MG/100ML; MG/100ML; MG/100ML
INJECTION, SOLUTION INTRAVENOUS CONTINUOUS
Status: DISCONTINUED | OUTPATIENT
Start: 2024-03-20 | End: 2024-03-20 | Stop reason: HOSPADM

## 2024-03-20 RX ORDER — ENOXAPARIN SODIUM 100 MG/ML
30 INJECTION SUBCUTANEOUS 2 TIMES DAILY
Status: CANCELLED | OUTPATIENT
Start: 2024-03-20

## 2024-03-20 RX ORDER — SODIUM CHLORIDE 0.9 % (FLUSH) 0.9 %
5-40 SYRINGE (ML) INJECTION EVERY 12 HOURS SCHEDULED
Status: CANCELLED | OUTPATIENT
Start: 2024-03-20

## 2024-03-20 RX ORDER — BUPIVACAINE HYDROCHLORIDE 5 MG/ML
INJECTION, SOLUTION EPIDURAL; INTRACAUDAL PRN
Status: DISCONTINUED | OUTPATIENT
Start: 2024-03-20 | End: 2024-03-20 | Stop reason: HOSPADM

## 2024-03-20 RX ORDER — ONDANSETRON 2 MG/ML
4 INJECTION INTRAMUSCULAR; INTRAVENOUS EVERY 6 HOURS PRN
Status: CANCELLED | OUTPATIENT
Start: 2024-03-20

## 2024-03-20 RX ORDER — LIDOCAINE HYDROCHLORIDE 10 MG/ML
1 INJECTION, SOLUTION EPIDURAL; INFILTRATION; INTRACAUDAL; PERINEURAL
Status: DISCONTINUED | OUTPATIENT
Start: 2024-03-20 | End: 2024-03-20 | Stop reason: HOSPADM

## 2024-03-20 RX ORDER — MIDAZOLAM HYDROCHLORIDE 2 MG/2ML
2 INJECTION, SOLUTION INTRAMUSCULAR; INTRAVENOUS
Status: DISCONTINUED | OUTPATIENT
Start: 2024-03-20 | End: 2024-03-20 | Stop reason: HOSPADM

## 2024-03-20 RX ORDER — PROCHLORPERAZINE EDISYLATE 5 MG/ML
5 INJECTION INTRAMUSCULAR; INTRAVENOUS
Status: DISCONTINUED | OUTPATIENT
Start: 2024-03-20 | End: 2024-03-20 | Stop reason: HOSPADM

## 2024-03-20 RX ORDER — HYDROMORPHONE HYDROCHLORIDE 1 MG/ML
0.5 INJECTION, SOLUTION INTRAMUSCULAR; INTRAVENOUS; SUBCUTANEOUS
Status: CANCELLED | OUTPATIENT
Start: 2024-03-20

## 2024-03-20 RX ORDER — OXYCODONE HYDROCHLORIDE 5 MG/1
5 TABLET ORAL
Status: COMPLETED | OUTPATIENT
Start: 2024-03-20 | End: 2024-03-20

## 2024-03-20 RX ORDER — ROCURONIUM BROMIDE 10 MG/ML
INJECTION, SOLUTION INTRAVENOUS PRN
Status: DISCONTINUED | OUTPATIENT
Start: 2024-03-20 | End: 2024-03-20 | Stop reason: SDUPTHER

## 2024-03-20 RX ORDER — ONDANSETRON 2 MG/ML
INJECTION INTRAMUSCULAR; INTRAVENOUS PRN
Status: DISCONTINUED | OUTPATIENT
Start: 2024-03-20 | End: 2024-03-20 | Stop reason: SDUPTHER

## 2024-03-20 RX ORDER — ACETAMINOPHEN 500 MG
1000 TABLET ORAL ONCE
Status: COMPLETED | OUTPATIENT
Start: 2024-03-20 | End: 2024-03-20

## 2024-03-20 RX ORDER — KETOROLAC TROMETHAMINE 30 MG/ML
INJECTION, SOLUTION INTRAMUSCULAR; INTRAVENOUS PRN
Status: DISCONTINUED | OUTPATIENT
Start: 2024-03-20 | End: 2024-03-20 | Stop reason: SDUPTHER

## 2024-03-20 RX ORDER — FENTANYL CITRATE 50 UG/ML
INJECTION, SOLUTION INTRAMUSCULAR; INTRAVENOUS PRN
Status: DISCONTINUED | OUTPATIENT
Start: 2024-03-20 | End: 2024-03-20 | Stop reason: SDUPTHER

## 2024-03-20 RX ORDER — DEXAMETHASONE SODIUM PHOSPHATE 4 MG/ML
INJECTION, SOLUTION INTRA-ARTICULAR; INTRALESIONAL; INTRAMUSCULAR; INTRAVENOUS; SOFT TISSUE PRN
Status: DISCONTINUED | OUTPATIENT
Start: 2024-03-20 | End: 2024-03-20 | Stop reason: SDUPTHER

## 2024-03-20 RX ORDER — HYDROMORPHONE HYDROCHLORIDE 1 MG/ML
0.5 INJECTION, SOLUTION INTRAMUSCULAR; INTRAVENOUS; SUBCUTANEOUS EVERY 5 MIN PRN
Status: DISCONTINUED | OUTPATIENT
Start: 2024-03-20 | End: 2024-03-20 | Stop reason: HOSPADM

## 2024-03-20 RX ORDER — LORAZEPAM 2 MG/ML
0.5 INJECTION INTRAMUSCULAR
Status: DISCONTINUED | OUTPATIENT
Start: 2024-03-20 | End: 2024-03-20 | Stop reason: HOSPADM

## 2024-03-20 RX ADMIN — HYDROMORPHONE HYDROCHLORIDE 0.5 MG: 1 INJECTION, SOLUTION INTRAMUSCULAR; INTRAVENOUS; SUBCUTANEOUS at 09:28

## 2024-03-20 RX ADMIN — ACETAMINOPHEN 1000 MG: 500 TABLET ORAL at 06:54

## 2024-03-20 RX ADMIN — ROCURONIUM BROMIDE 5 MG: 10 INJECTION INTRAVENOUS at 09:31

## 2024-03-20 RX ADMIN — PROPOFOL 200 MG: 10 INJECTION, EMULSION INTRAVENOUS at 07:35

## 2024-03-20 RX ADMIN — LIDOCAINE HYDROCHLORIDE 100 MG: 20 INJECTION, SOLUTION EPIDURAL; INFILTRATION; INTRACAUDAL; PERINEURAL at 07:35

## 2024-03-20 RX ADMIN — CEFAZOLIN 2 G: 1 INJECTION, POWDER, FOR SOLUTION INTRAMUSCULAR; INTRAVENOUS at 07:43

## 2024-03-20 RX ADMIN — HYDROMORPHONE HYDROCHLORIDE 0.5 MG: 1 INJECTION, SOLUTION INTRAMUSCULAR; INTRAVENOUS; SUBCUTANEOUS at 08:15

## 2024-03-20 RX ADMIN — ROCURONIUM BROMIDE 5 MG: 10 INJECTION INTRAVENOUS at 07:35

## 2024-03-20 RX ADMIN — Medication 80 MCG: at 09:07

## 2024-03-20 RX ADMIN — ROCURONIUM BROMIDE 15 MG: 10 INJECTION INTRAVENOUS at 10:14

## 2024-03-20 RX ADMIN — FENTANYL CITRATE 100 MCG: 50 INJECTION, SOLUTION INTRAMUSCULAR; INTRAVENOUS at 07:35

## 2024-03-20 RX ADMIN — ROCURONIUM BROMIDE 10 MG: 10 INJECTION INTRAVENOUS at 09:00

## 2024-03-20 RX ADMIN — HYDROMORPHONE HYDROCHLORIDE 0.5 MG: 1 INJECTION, SOLUTION INTRAMUSCULAR; INTRAVENOUS; SUBCUTANEOUS at 11:11

## 2024-03-20 RX ADMIN — ROCURONIUM BROMIDE 10 MG: 10 INJECTION INTRAVENOUS at 09:46

## 2024-03-20 RX ADMIN — NEOSTIGMINE METHYLSULFATE 5 MG: 1 INJECTION, SOLUTION INTRAVENOUS at 10:43

## 2024-03-20 RX ADMIN — MIDAZOLAM 2 MG: 1 INJECTION INTRAMUSCULAR; INTRAVENOUS at 07:25

## 2024-03-20 RX ADMIN — ROCURONIUM BROMIDE 10 MG: 10 INJECTION INTRAVENOUS at 07:47

## 2024-03-20 RX ADMIN — SODIUM CHLORIDE, POTASSIUM CHLORIDE, SODIUM LACTATE AND CALCIUM CHLORIDE: 600; 310; 30; 20 INJECTION, SOLUTION INTRAVENOUS at 06:47

## 2024-03-20 RX ADMIN — ROCURONIUM BROMIDE 10 MG: 10 INJECTION INTRAVENOUS at 08:15

## 2024-03-20 RX ADMIN — Medication 25 MG: at 07:48

## 2024-03-20 RX ADMIN — ROCURONIUM BROMIDE 35 MG: 10 INJECTION INTRAVENOUS at 07:41

## 2024-03-20 RX ADMIN — KETOROLAC TROMETHAMINE 30 MG: 30 INJECTION, SOLUTION INTRAMUSCULAR; INTRAVENOUS at 10:43

## 2024-03-20 RX ADMIN — GLYCOPYRROLATE 0.8 MG: 0.2 INJECTION, SOLUTION INTRAMUSCULAR; INTRAVENOUS at 10:43

## 2024-03-20 RX ADMIN — ONDANSETRON 4 MG: 2 INJECTION INTRAMUSCULAR; INTRAVENOUS at 10:41

## 2024-03-20 RX ADMIN — OXYCODONE 5 MG: 5 TABLET ORAL at 12:38

## 2024-03-20 RX ADMIN — Medication 140 MG: at 07:35

## 2024-03-20 RX ADMIN — DEXAMETHASONE SODIUM PHOSPHATE 8 MG: 4 INJECTION INTRA-ARTICULAR; INTRALESIONAL; INTRAMUSCULAR; INTRAVENOUS; SOFT TISSUE at 07:43

## 2024-03-20 RX ADMIN — Medication 25 MG: at 07:53

## 2024-03-20 ASSESSMENT — PAIN - FUNCTIONAL ASSESSMENT
PAIN_FUNCTIONAL_ASSESSMENT: 0-10

## 2024-03-20 ASSESSMENT — PAIN DESCRIPTION - DESCRIPTORS
DESCRIPTORS: SORE

## 2024-03-20 ASSESSMENT — PAIN DESCRIPTION - LOCATION: LOCATION: ABDOMEN

## 2024-03-20 ASSESSMENT — PAIN SCALES - GENERAL: PAINLEVEL_OUTOF10: 5

## 2024-03-20 ASSESSMENT — PAIN DESCRIPTION - ORIENTATION: ORIENTATION: MID

## 2024-03-20 NOTE — OP NOTE
[address]  [address]                            OPERATIVE REPORT      PATIENT NAME: DAMEON ARNOLD        : 1980  MED REC NO: [id number]                       ROOM: OR  ACCOUNT NO: [id number]                       ADMIT DATE: 2024  PROVIDER: Wally Meyer MD    DATE OF SERVICE:  2024    PREOPERATIVE DIAGNOSES:  Pelvic pain, enlarged fibroid uterus, irregular cycles.    POSTOPERATIVE DIAGNOSES:  Pelvic pain, enlarged fibroid uterus, irregular cycles.    PROCEDURES PERFORMED:  Da Patricia diagnostic laparoscopy, laparoscopic myomectomy.    SURGEON:  Wally Meyer MD    ASSISTANT:  Waqas Cast MD    ANESTHESIA:  General.    ESTIMATED BLOOD LOSS:  Less than 100 mL.    SPECIMENS REMOVED:  Morcellated fibroid.    INTRAOPERATIVE FINDINGS:  Normal uterus, fallopian tubes, and ovaries.  Large posterior leiomyoma approximately 12 x 20 cm in size extending to the uterosacral ligament.     COMPLICATIONS:  None.    IMPLANTS:       INDICATIONS:       DESCRIPTION OF PROCEDURE:  After extensive counseling, risks and benefits of the procedure, complication rates of the procedure as well as discussion with the patient of possible hysterectomy, consent was signed.  She was taken to the operating room.  After preoperative antibiotics, SCDs, and a bowel prep, she was placed in the dorsal lithotomy position, prepped and draped in usual sterile manner for the surgical procedure.  Newby catheter was placed.  Clear urine was noted.  An attempt to place a uterine manipulator was made but with difficulty due to the position of the cervix and stenosis.  A sponge stick was placed in the vagina.  Attention was turned to the abdomen where a Veress needle was placed infraumbilically and confirmed with a water drop test.  Insufflation of CO2 up to 15 mmHg was then performed without complication.  In light of the uterus measuring approximately 24

## 2024-03-20 NOTE — ANESTHESIA PRE PROCEDURE
Department of Anesthesiology  Preprocedure Note       Name:  Luz Martinez   Age:  43 y.o.  :  1980                                          MRN:  [id number]         Date:  3/20/2024      Surgeon: Surgeon(s):  Waqas Cast MD Shaban, Danny W, MD    Procedure: Procedure(s):  ROBOTIC MYOMECTOMY    Medications prior to admission:   Prior to Admission medications    Medication Sig Start Date End Date Taking? Authorizing Provider   oxyCODONE-acetaminophen (PERCOCET) 5-325 MG per tablet Take 1 tablet by mouth every 6 hours as needed for Pain for up to 5 days. Intended supply: 5 days. Take lowest dose possible to manage pain Max Daily Amount: 4 tablets 3/20/24 3/25/24 Yes Waqas Cast MD   ibuprofen (ADVIL;MOTRIN) 800 MG tablet Take 1 tablet by mouth 3 times daily as needed for Pain 3/20/24  Yes Waqas Cast MD   Semaglutide-Weight Management (WEGOVY) 2.4 MG/0.75ML SOAJ SC injection Inject 2.4 mg into the skin every 7 days     ProviderPatti MD   cetirizine (ZYRTEC) 10 MG tablet Take 1 tablet by mouth daily    Patti Carty MD   levothyroxine (SYNTHROID) 175 MCG tablet Take 1 tablet by mouth Daily    ProviderPatti MD       Current medications:    Current Facility-Administered Medications   Medication Dose Route Frequency Provider Last Rate Last Admin    lidocaine PF 1 % injection 1 mL  1 mL IntraDERmal Once PRN Jostin Hernandez MD        fentaNYL (SUBLIMAZE) injection 100 mcg  100 mcg IntraVENous Once PRN Jostin Hernandez MD        lactated ringers IV soln infusion   IntraVENous Continuous Jostin Hernandez  mL/hr at 24 0647 New Bag at 24 0647    sodium chloride flush 0.9 % injection 5-40 mL  5-40 mL IntraVENous 2 times per day Jostin Hernandez MD        sodium chloride flush 0.9 % injection 5-40 mL  5-40 mL IntraVENous PRN Jostin Hernandez MD        0.9 % sodium chloride infusion   IntraVENous PRN Jostin Hernandez MD        midazolam PF (VERSED)  Counseling given: Not Answered      Vital Signs (Current):   Vitals:    03/20/24 0630   BP: 128/82   Pulse: 83   Resp: 16   Temp: 98.2 °F (36.8 °C)   TempSrc: Oral   SpO2: 95%   Weight: 114.3 kg (252 lb)   Height: 1.702 m (5' 7\")                                              BP Readings from Last 3 Encounters:   03/20/24 128/82   03/08/24 120/80       NPO Status: Time of last liquid consumption: 2100                        Time of last solid consumption: 1200                        Date of last liquid consumption: 03/19/24                        Date of last solid food consumption: 03/19/24    BMI:   Wt Readings from Last 3 Encounters:   03/20/24 114.3 kg (252 lb)   03/08/24 114.3 kg (252 lb)     Body mass index is 39.47 kg/m².    CBC:   Lab Results   Component Value Date/Time    WBC 6.0 03/08/2024 11:06 AM    RBC 4.95 03/08/2024 11:06 AM    HGB 14.3 03/08/2024 11:06 AM    HCT 44.3 03/08/2024 11:06 AM    MCV 89.5 03/08/2024 11:06 AM    RDW 13.6 03/08/2024 11:06 AM     03/08/2024 11:06 AM       CMP: No results found for: \"NA\", \"K\", \"CL\", \"CO2\", \"BUN\", \"CREATININE\", \"GFRAA\", \"AGRATIO\", \"LABGLOM\", \"GLUCOSE\", \"GLU\", \"PROT\", \"CALCIUM\", \"BILITOT\", \"ALKPHOS\", \"AST\", \"ALT\"    POC Tests: No results for input(s): \"POCGLU\", \"POCNA\", \"POCK\", \"POCCL\", \"POCBUN\", \"POCHEMO\", \"POCHCT\" in the last 72 hours.    Coags: No results found for: \"PROTIME\", \"INR\", \"APTT\"    HCG (If Applicable):   Lab Results   Component Value Date    PREGTESTUR Negative 03/20/2024        ABGs: No results found for: \"PHART\", \"PO2ART\", \"MHK5KBF\", \"CAW8XLD\", \"BEART\", \"Z2WVWTGI\"     Type & Screen (If Applicable):  No results found for: \"LABABO\", \"LABRH\"    Drug/Infectious Status (If Applicable):  No results found for: \"HIV\", \"HEPCAB\"    COVID-19 Screening (If Applicable): No results found for: \"COVID19\"        Anesthesia Evaluation  Patient summary reviewed and Nursing notes reviewed  Airway: Mallampati: II  TM distance: >3 FB   Neck ROM: full  Mouth

## 2024-03-20 NOTE — DISCHARGE INSTRUCTIONS
Laparoscopy: What to Expect at Home  Your Recovery  After laparoscopic surgery, you are likely to have pain for the next several days. You may have a low fever and feel tired and sick to your stomach. This is common. You should feel better after 1 to 2 weeks.  This care sheet gives you a general idea about how long it will take for you to recover. But each person recovers at a different pace. Follow the steps below to get better as quickly as possible.  How can you care for yourself at home?  Activity    Rest when you feel tired. Getting enough sleep will help you recover.     Try to walk each day. Start by walking a little more than you did the day before. Bit by bit, increase the amount you walk. Walking boosts blood flow and helps prevent pneumonia and constipation.     Avoid strenuous activities, such as bicycle riding, jogging, weight lifting, or aerobic exercise, until your doctor says it is okay.     Avoid lifting anything that would make you strain. This may include a child, heavy grocery bags and milk containers, a heavy briefcase or backpack, cat litter or dog food bags, or a vacuum .     You may also have some shoulder or back pain. This pain is caused by the gas your doctor used to inflate your belly to help see your organs better. The pain usually lasts about 1 or 2 days.     You may drive when you are no longer taking pain medicine and can quickly move your foot from the gas pedal to the brake. You must also be able to sit comfortably for a long period of time, even if you do not plan to go far. You might get caught in traffic.     You may need to take a few days to a few weeks off work. It depends on the type of work you do and how you feel.     You may shower 24 to 48 hours after surgery, if your doctor okays it. Pat the cut (incision) dry. Do not take a bath for the first 2 weeks, or until your doctor tells you it is okay.   Diet    If your stomach is upset, try bland, low-fat foods such

## 2024-03-20 NOTE — FLOWSHEET NOTE
03/20/24 0843   Family Communication    Relationship to Patient Parent    Phone Number [phone]   Family/Significant Other Update Called;Updated   Delivery Origin Nurse   Message Disposition Family present - message delivered   Update Given Yes   Family Communication   Family Update Message Procedure started

## 2024-04-01 NOTE — ANESTHESIA POSTPROCEDURE EVALUATION
Post-Anesthesia Evaluation and Assessment    Patient: Luz Martinez MRN: [id number]  SSN: [id number]    YOB: 1980  Age: 43 y.o.  Sex: female      I have evaluated the patient and they are stable and ready for discharge from the PACU.     Cardiovascular Function/Vital Signs  Visit Vitals  /64   Pulse 91   Temp 98.1 °F (36.7 °C) (Oral)   Resp 14   Ht 1.702 m (5' 7\")   Wt 114.3 kg (252 lb)   SpO2 92%   BMI 39.47 kg/m²       Patient is status post General anesthesia for Procedure(s):  ROBOTIC MYOMECTOMY.    Nausea/Vomiting: None    Postoperative hydration reviewed and adequate.    Pain:      Managed    Neurological Status:       At baseline    Mental Status, Level of Consciousness: Alert and  oriented to person, place, and time    Pulmonary Status:       Adequate oxygenation and airway patent    Complications related to anesthesia: None    Post-anesthesia assessment completed. No concerns    Signed By: Veronica Walker MD     April 1, 2024            Department of Anesthesiology  Postprocedure Note    Patient: Luz Martinez  MRN: [id number]  YOB: 1980  Date of evaluation: 4/1/2024    Procedure Summary       Date: 03/20/24 Room / Location: [facility] MAIN OR [address] [place] [address] MAIN OR    Anesthesia Start: 0728 Anesthesia Stop: 1110    Procedure: ROBOTIC MYOMECTOMY (Abdomen/Perineum) Diagnosis:       Low back pain, unspecified back pain laterality, unspecified chronicity, unspecified whether sciatica present      Enlarged uterus      Irregular menstrual cycle      Uterine leiomyoma, unspecified location      (Low back pain, unspecified back pain laterality, unspecified chronicity, unspecified whether sciatica present [M54.50])      (Enlarged uterus [N85.2])      (Irregular menstrual cycle [N92.6])      (Uterine leiomyoma, unspecified location [D25.9])    Providers: Wally Meyer MD Responsible Provider: Veronica Walker MD    Anesthesia Type: general ASA Status: 3            Anesthesia Type: No

## (undated) DEVICE — INTENT OT USE PROVIDES A STERILE INTERFACE BETWEEN THE OPERATING ROOM SURGICAL LAMPS (NON-STERILE) AND THE SURGEON OR STAFF WORKING IN THE STERILE FIELD.: Brand: ASPEN® ALC PLUS LIGHT HANDLE COVER

## (undated) DEVICE — SEAL

## (undated) DEVICE — SOLUTION IRRIG 1000ML 0.9% SOD CHL USP POUR PLAS BTL

## (undated) DEVICE — ARM DRAPE

## (undated) DEVICE — GLOVE ORANGE PI 7 1/2   MSG9075

## (undated) DEVICE — SYRINGE MED 10ML LUERLOCK TIP W/O SFTY DISP

## (undated) DEVICE — OBTURATOR ROBOTIC DIA8MM BLDELSS ENDOSCP DISP DA VINCI SI

## (undated) DEVICE — SURGICEL ENDOSCP APPL

## (undated) DEVICE — TIP COVER ACCESSORY

## (undated) DEVICE — AGENT HEMSTAT 3GM OXIDIZED REGENERATED CELOS ABSRB FOR CONT (ORDER MULTIPLES OF 5EA)

## (undated) DEVICE — SUTURE MCRYL SZ 4-0 L27IN ABSRB UD L19MM PS-2 1/2 CIR PRIM Y426H

## (undated) DEVICE — SYSTEM EVAC SMOKE LAPARSCOPIC

## (undated) DEVICE — TAPE,CLOTH/SILK,CURAD,3"X10YD,LF,40/CS: Brand: CURAD

## (undated) DEVICE — GYN LAPAROSCOPY - SMH: Brand: MEDLINE INDUSTRIES, INC.

## (undated) DEVICE — INSUFFLATION NEEDLE TO ESTABLISH PNEUMOPERITONEUM.: Brand: INSUFFLATION NEEDLE

## (undated) DEVICE — ELECTRO LUBE IS A SINGLE PATIENT USE DEVICE THAT IS INTENDED TO BE USED ON ELECTROSURGICAL ELECTRODES TO REDUCE STICKING.: Brand: KEY SURGICAL ELECTRO LUBE

## (undated) DEVICE — BLADE CLIPPER GEN PURP NS

## (undated) DEVICE — Device